# Patient Record
Sex: FEMALE | URBAN - METROPOLITAN AREA
[De-identification: names, ages, dates, MRNs, and addresses within clinical notes are randomized per-mention and may not be internally consistent; named-entity substitution may affect disease eponyms.]

---

## 2020-08-11 ENCOUNTER — OFFICE VISIT (OUTPATIENT)
Dept: URBAN - METROPOLITAN AREA TELEHEALTH 2 | Facility: TELEHEALTH | Age: 75
End: 2020-08-11
Payer: MEDICARE

## 2020-08-11 DIAGNOSIS — K59.01 CONSTIPATION BY DELAYED COLONIC TRANSIT: ICD-10-CM

## 2020-08-11 DIAGNOSIS — R14.2 BELCHING: ICD-10-CM

## 2020-08-11 DIAGNOSIS — K57.30 DIVERTICULA OF COLON: ICD-10-CM

## 2020-08-11 DIAGNOSIS — K44.9 HIATAL HERNIA: ICD-10-CM

## 2020-08-11 PROCEDURE — G8427 DOCREV CUR MEDS BY ELIG CLIN: HCPCS | Performed by: INTERNAL MEDICINE

## 2020-08-11 PROCEDURE — 99213 OFFICE O/P EST LOW 20 MIN: CPT | Performed by: INTERNAL MEDICINE

## 2020-08-11 PROCEDURE — G8420 CALC BMI NORM PARAMETERS: HCPCS | Performed by: INTERNAL MEDICINE

## 2020-08-11 PROCEDURE — 1036F TOBACCO NON-USER: CPT | Performed by: INTERNAL MEDICINE

## 2020-08-11 PROCEDURE — G9903 PT SCRN TBCO ID AS NON USER: HCPCS | Performed by: INTERNAL MEDICINE

## 2020-08-11 RX ORDER — ASPIRIN 81 MG/1
TABLET, CHEWABLE ORAL
Qty: 0 | Refills: 0 | Status: ACTIVE | COMMUNITY
Start: 1900-01-01

## 2020-08-11 RX ORDER — UBIDECARENONE 30 MG
TAKE 1 TABLET BY ORAL ROUTE 2 TIMES A DAY CAPSULE ORAL 2
Qty: 0 | Refills: 0 | Status: ACTIVE | COMMUNITY
Start: 1900-01-01

## 2020-08-11 RX ORDER — MECLIZINE HYDROCHLORIDE 25 MG/1
TABLET ORAL
Qty: 0 | Refills: 0 | Status: ACTIVE | COMMUNITY
Start: 1900-01-01

## 2020-08-11 RX ORDER — LACTULOSE 10 G/15ML
TAKE 15 MILLILITERS (10 GRAM) BY ORAL ROUTE ONCE DAILY PRN CONSTIPATION SOLUTION ORAL 1
Qty: 450 | Refills: 6 | Status: ACTIVE | COMMUNITY
Start: 2020-02-20 | End: 2020-09-17

## 2020-08-11 RX ORDER — ROSUVASTATIN CALCIUM 5 MG/1
TABLET, FILM COATED ORAL
Qty: 0 | Refills: 0 | Status: ACTIVE | COMMUNITY
Start: 2018-01-24

## 2020-08-11 RX ORDER — TRAVOPROST 0.04 MG/ML
SOLUTION/ DROPS OPHTHALMIC
Qty: 0 | Refills: 0 | Status: ACTIVE | COMMUNITY
Start: 1900-01-01

## 2020-08-11 RX ORDER — VERAPAMIL HYDROCHLORIDE 180 MG/1
TABLET ORAL
Qty: 0 | Refills: 0 | Status: ACTIVE | COMMUNITY
Start: 2017-01-14

## 2020-08-11 RX ORDER — LATANOPROST/PF 0.005 %
DROPS OPHTHALMIC (EYE)
Qty: 0 | Refills: 0 | Status: ACTIVE | COMMUNITY
Start: 2018-01-24

## 2020-08-11 RX ORDER — LISINOPRIL 20 MG/1
TAKE 1.5 TABLETS BY ORAL ROUTE TABLET ORAL
Qty: 0 | Refills: 0 | Status: ACTIVE | COMMUNITY
Start: 1900-01-01

## 2020-08-11 NOTE — HPI-TODAY'S VISIT:
well known to me here for f/u re change in bowel habits last colonoscopy 2018 w/ diveriticulosis, otw normal, bx negative for colitis tried lactulose, senna, metamucil feels like there is a golf ball at the end of her rectum belching is an issue had no BMs last week, titrating lactulose, making her stool mushy\ had a fall 6 months ago , has been doing phsyical therapy

## 2020-08-20 ENCOUNTER — OFFICE VISIT (OUTPATIENT)
Dept: URBAN - METROPOLITAN AREA CLINIC 96 | Facility: CLINIC | Age: 75
End: 2020-08-20

## 2020-08-20 ENCOUNTER — TELEPHONE ENCOUNTER (OUTPATIENT)
Dept: URBAN - METROPOLITAN AREA CLINIC 96 | Facility: CLINIC | Age: 75
End: 2020-08-20

## 2020-08-25 ENCOUNTER — OFFICE VISIT (OUTPATIENT)
Dept: URBAN - METROPOLITAN AREA CLINIC 96 | Facility: CLINIC | Age: 75
End: 2020-08-25
Payer: MEDICARE

## 2020-08-25 ENCOUNTER — DASHBOARD ENCOUNTERS (OUTPATIENT)
Age: 75
End: 2020-08-25

## 2020-08-25 DIAGNOSIS — K57.30 DIVERTICULA OF COLON: ICD-10-CM

## 2020-08-25 DIAGNOSIS — K59.01 CONSTIPATION BY DELAYED COLONIC TRANSIT: ICD-10-CM

## 2020-08-25 DIAGNOSIS — K44.9 HIATAL HERNIA: ICD-10-CM

## 2020-08-25 PROCEDURE — G8427 DOCREV CUR MEDS BY ELIG CLIN: HCPCS | Performed by: INTERNAL MEDICINE

## 2020-08-25 PROCEDURE — G8420 CALC BMI NORM PARAMETERS: HCPCS | Performed by: INTERNAL MEDICINE

## 2020-08-25 PROCEDURE — G9903 PT SCRN TBCO ID AS NON USER: HCPCS | Performed by: INTERNAL MEDICINE

## 2020-08-25 PROCEDURE — 1036F TOBACCO NON-USER: CPT | Performed by: INTERNAL MEDICINE

## 2020-08-25 PROCEDURE — 99214 OFFICE O/P EST MOD 30 MIN: CPT | Performed by: INTERNAL MEDICINE

## 2020-08-25 RX ORDER — LACTULOSE 10 G/15ML
TAKE 15 MILLILITERS (10 GRAM) BY ORAL ROUTE ONCE DAILY PRN CONSTIPATION SOLUTION ORAL 1
Qty: 450 | Refills: 6 | Status: ACTIVE | COMMUNITY
Start: 2020-02-20 | End: 2020-09-17

## 2020-08-25 RX ORDER — ASPIRIN 81 MG/1
TABLET, CHEWABLE ORAL
Qty: 0 | Refills: 0 | Status: ACTIVE | COMMUNITY
Start: 1900-01-01

## 2020-08-25 RX ORDER — VERAPAMIL HYDROCHLORIDE 180 MG/1
TABLET ORAL
Qty: 0 | Refills: 0 | Status: ACTIVE | COMMUNITY
Start: 2017-01-14

## 2020-08-25 RX ORDER — TRAVOPROST 0.04 MG/ML
SOLUTION/ DROPS OPHTHALMIC
Qty: 0 | Refills: 0 | Status: DISCONTINUED | COMMUNITY
Start: 1900-01-01

## 2020-08-25 RX ORDER — MECLIZINE HYDROCHLORIDE 25 MG/1
TABLET ORAL
Qty: 0 | Refills: 0 | Status: ACTIVE | COMMUNITY
Start: 1900-01-01

## 2020-08-25 RX ORDER — ROSUVASTATIN CALCIUM 5 MG/1
TABLET, FILM COATED ORAL
Qty: 0 | Refills: 0 | Status: ACTIVE | COMMUNITY
Start: 2018-01-24

## 2020-08-25 RX ORDER — LATANOPROST/PF 0.005 %
DROPS OPHTHALMIC (EYE)
Qty: 0 | Refills: 0 | Status: DISCONTINUED | COMMUNITY
Start: 2018-01-24

## 2020-08-25 RX ORDER — LISINOPRIL 20 MG/1
TAKE 1.5 TABLETS BY ORAL ROUTE TABLET ORAL
Qty: 0 | Refills: 0 | Status: ACTIVE | COMMUNITY
Start: 1900-01-01

## 2020-08-25 RX ORDER — UBIDECARENONE 30 MG
TAKE 1 TABLET BY ORAL ROUTE 2 TIMES A DAY CAPSULE ORAL 2
Qty: 0 | Refills: 0 | Status: ACTIVE | COMMUNITY
Start: 1900-01-01

## 2020-08-25 RX ORDER — OMEPRAZOLE 40 MG/1
TAKE 1 CAPSULE ONCE DAILY  BEFORE A MEAL CAPSULE, DELAYED RELEASE ORAL 1
Qty: 90 | Refills: 3 | OUTPATIENT
Start: 2020-08-25

## 2020-08-25 NOTE — HPI-TODAY'S VISIT:
well known to me. is in recovery for alcohol. here for f/u re change in bowel habits/bloating/belching. 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  last colonoscopy 2018 w/ diveriticulosis, otw normal, bx negative for colitis tried lactulose, senna, metamucil feels like there is a golf ball at the end of her rectum belching is an issue had no BMs last week, titrating lactulose, making her stool mushy\ had a fall 6 months ago , has been doing phsyical therapy gerd/indigestion type sx. not on a ppi

## 2020-11-02 ENCOUNTER — TELEPHONE ENCOUNTER (OUTPATIENT)
Dept: URBAN - METROPOLITAN AREA CLINIC 98 | Facility: CLINIC | Age: 75
End: 2020-11-02

## 2020-12-07 ENCOUNTER — ERX REFILL RESPONSE (OUTPATIENT)
Age: 75
End: 2020-12-07

## 2020-12-07 RX ORDER — LACTULOSE 10 G/15ML
TAKE 15 ML BY MOUTH ONCE DAILY AS NEEDED FOR CONSTIPATION SOLUTION ORAL
Qty: 450 | Refills: 0

## 2021-01-14 ENCOUNTER — ERX REFILL RESPONSE (OUTPATIENT)
Age: 76
End: 2021-01-14

## 2021-01-14 RX ORDER — LACTULOSE 10 G/15ML
TAKE 15 ML BY MOUTH ONCE DAILY AS NEEDED FOR CONSTIPATION SOLUTION ORAL
Qty: 450 | Refills: 0

## 2023-05-16 ENCOUNTER — WEB ENCOUNTER (OUTPATIENT)
Dept: URBAN - METROPOLITAN AREA CLINIC 96 | Facility: CLINIC | Age: 78
End: 2023-05-16

## 2023-05-18 ENCOUNTER — OFFICE VISIT (OUTPATIENT)
Dept: URBAN - METROPOLITAN AREA CLINIC 96 | Facility: CLINIC | Age: 78
End: 2023-05-18

## 2023-06-06 ENCOUNTER — TELEPHONE ENCOUNTER (OUTPATIENT)
Dept: URBAN - METROPOLITAN AREA CLINIC 96 | Facility: CLINIC | Age: 78
End: 2023-06-06

## 2023-06-06 ENCOUNTER — OFFICE VISIT (OUTPATIENT)
Dept: URBAN - METROPOLITAN AREA TELEHEALTH 2 | Facility: TELEHEALTH | Age: 78
End: 2023-06-06

## 2023-06-06 RX ORDER — UBIDECARENONE 30 MG
TAKE 1 TABLET BY ORAL ROUTE 2 TIMES A DAY CAPSULE ORAL 2
Qty: 0 | Refills: 0 | Status: ACTIVE | COMMUNITY
Start: 1900-01-01

## 2023-06-06 RX ORDER — ASPIRIN 81 MG/1
TABLET, CHEWABLE ORAL
Qty: 0 | Refills: 0 | Status: ACTIVE | COMMUNITY
Start: 1900-01-01

## 2023-06-06 RX ORDER — LISINOPRIL 20 MG/1
TAKE 1.5 TABLETS BY ORAL ROUTE TABLET ORAL
Qty: 0 | Refills: 0 | Status: ACTIVE | COMMUNITY
Start: 1900-01-01

## 2023-06-06 RX ORDER — OMEPRAZOLE 40 MG/1
TAKE 1 CAPSULE ONCE DAILY  BEFORE A MEAL CAPSULE, DELAYED RELEASE ORAL 1
Qty: 90 | Refills: 3 | Status: ACTIVE | COMMUNITY
Start: 2020-08-25

## 2023-06-06 RX ORDER — VERAPAMIL HYDROCHLORIDE 180 MG/1
TABLET ORAL
Qty: 0 | Refills: 0 | Status: ACTIVE | COMMUNITY
Start: 2017-01-14

## 2023-06-06 RX ORDER — MECLIZINE HYDROCHLORIDE 25 MG/1
TABLET ORAL
Qty: 0 | Refills: 0 | Status: ACTIVE | COMMUNITY
Start: 1900-01-01

## 2023-06-06 RX ORDER — ROSUVASTATIN CALCIUM 5 MG/1
TABLET, FILM COATED ORAL
Qty: 0 | Refills: 0 | Status: ACTIVE | COMMUNITY
Start: 2018-01-24

## 2023-06-06 RX ORDER — LACTULOSE 10 G/15ML
TAKE 15 ML BY MOUTH ONCE DAILY AS NEEDED FOR CONSTIPATION SOLUTION ORAL
Qty: 450 | Refills: 0 | Status: ACTIVE | COMMUNITY